# Patient Record
Sex: FEMALE | Race: WHITE | NOT HISPANIC OR LATINO | URBAN - METROPOLITAN AREA
[De-identification: names, ages, dates, MRNs, and addresses within clinical notes are randomized per-mention and may not be internally consistent; named-entity substitution may affect disease eponyms.]

---

## 2020-06-10 ENCOUNTER — OFFICE (OUTPATIENT)
Dept: URBAN - METROPOLITAN AREA CLINIC 10 | Facility: CLINIC | Age: 35
End: 2020-06-10

## 2020-06-10 VITALS
SYSTOLIC BLOOD PRESSURE: 134 MMHG | DIASTOLIC BLOOD PRESSURE: 95 MMHG | HEART RATE: 106 BPM | WEIGHT: 187 LBS | HEIGHT: 68 IN

## 2020-06-10 DIAGNOSIS — K30 FUNCTIONAL DYSPEPSIA: ICD-10-CM

## 2020-06-10 DIAGNOSIS — R10.32 LEFT LOWER QUADRANT PAIN: ICD-10-CM

## 2020-06-10 DIAGNOSIS — Z86.010 PERSONAL HISTORY OF COLONIC POLYPS: ICD-10-CM

## 2020-06-10 DIAGNOSIS — R10.31 RIGHT LOWER QUADRANT PAIN: ICD-10-CM

## 2020-06-10 PROCEDURE — 99204 OFFICE O/P NEW MOD 45 MIN: CPT | Performed by: INTERNAL MEDICINE

## 2020-06-10 RX ORDER — HYOSCYAMINE SULFATE 0.12 MG/1
TABLET ORAL
Qty: 60 | Refills: 3 | Status: COMPLETED
Start: 2020-06-10 | End: 2020-11-16

## 2020-06-10 RX ORDER — PANTOPRAZOLE SODIUM 40 MG/1
TABLET, DELAYED RELEASE ORAL
Qty: 30 | Refills: 11 | Status: COMPLETED
Start: 2020-06-10 | End: 2020-11-16

## 2020-06-10 RX ORDER — SODIUM PICOSULFATE, MAGNESIUM OXIDE, AND ANHYDROUS CITRIC ACID 10; 3.5; 12 MG/160ML; G/160ML; G/160ML
LIQUID ORAL
Qty: 1 | Refills: 0 | Status: COMPLETED
Start: 2020-06-10 | End: 2020-07-16

## 2020-07-16 ENCOUNTER — AMBULATORY SURGICAL CENTER (OUTPATIENT)
Dept: URBAN - METROPOLITAN AREA SURGERY 1 | Facility: SURGERY | Age: 35
End: 2020-07-16
Payer: COMMERCIAL

## 2020-07-16 ENCOUNTER — OFFICE (OUTPATIENT)
Dept: URBAN - METROPOLITAN AREA PATHOLOGY 22 | Facility: PATHOLOGY | Age: 35
End: 2020-07-16

## 2020-07-16 ENCOUNTER — AMBULATORY SURGICAL CENTER (OUTPATIENT)
Dept: URBAN - METROPOLITAN AREA SURGERY 1 | Facility: SURGERY | Age: 35
End: 2020-07-16

## 2020-07-16 VITALS
SYSTOLIC BLOOD PRESSURE: 142 MMHG | WEIGHT: 182 LBS | HEART RATE: 89 BPM | SYSTOLIC BLOOD PRESSURE: 124 MMHG | SYSTOLIC BLOOD PRESSURE: 119 MMHG | HEART RATE: 89 BPM | SYSTOLIC BLOOD PRESSURE: 130 MMHG | SYSTOLIC BLOOD PRESSURE: 130 MMHG | TEMPERATURE: 98.7 F | TEMPERATURE: 98.7 F | HEART RATE: 98 BPM | TEMPERATURE: 97.9 F | SYSTOLIC BLOOD PRESSURE: 142 MMHG | HEART RATE: 90 BPM | DIASTOLIC BLOOD PRESSURE: 62 MMHG | DIASTOLIC BLOOD PRESSURE: 45 MMHG | WEIGHT: 182 LBS | DIASTOLIC BLOOD PRESSURE: 62 MMHG | DIASTOLIC BLOOD PRESSURE: 75 MMHG | SYSTOLIC BLOOD PRESSURE: 115 MMHG | DIASTOLIC BLOOD PRESSURE: 45 MMHG | RESPIRATION RATE: 17 BRPM | RESPIRATION RATE: 17 BRPM | OXYGEN SATURATION: 100 % | SYSTOLIC BLOOD PRESSURE: 142 MMHG | RESPIRATION RATE: 16 BRPM | HEART RATE: 89 BPM | TEMPERATURE: 97.9 F | SYSTOLIC BLOOD PRESSURE: 119 MMHG | DIASTOLIC BLOOD PRESSURE: 74 MMHG | HEART RATE: 98 BPM | HEART RATE: 98 BPM | HEIGHT: 68 IN | SYSTOLIC BLOOD PRESSURE: 130 MMHG | SYSTOLIC BLOOD PRESSURE: 115 MMHG | OXYGEN SATURATION: 100 % | SYSTOLIC BLOOD PRESSURE: 124 MMHG | HEART RATE: 90 BPM | RESPIRATION RATE: 18 BRPM | HEART RATE: 95 BPM | DIASTOLIC BLOOD PRESSURE: 75 MMHG | SYSTOLIC BLOOD PRESSURE: 124 MMHG | SYSTOLIC BLOOD PRESSURE: 119 MMHG | DIASTOLIC BLOOD PRESSURE: 60 MMHG | RESPIRATION RATE: 18 BRPM | SYSTOLIC BLOOD PRESSURE: 115 MMHG | DIASTOLIC BLOOD PRESSURE: 45 MMHG | RESPIRATION RATE: 16 BRPM | DIASTOLIC BLOOD PRESSURE: 60 MMHG | TEMPERATURE: 98.7 F | HEART RATE: 88 BPM | DIASTOLIC BLOOD PRESSURE: 60 MMHG | HEART RATE: 88 BPM | HEIGHT: 68 IN | RESPIRATION RATE: 16 BRPM | HEART RATE: 95 BPM | RESPIRATION RATE: 17 BRPM | DIASTOLIC BLOOD PRESSURE: 75 MMHG | DIASTOLIC BLOOD PRESSURE: 74 MMHG | DIASTOLIC BLOOD PRESSURE: 62 MMHG | WEIGHT: 182 LBS | DIASTOLIC BLOOD PRESSURE: 74 MMHG | HEART RATE: 88 BPM | TEMPERATURE: 97.9 F | HEIGHT: 68 IN | OXYGEN SATURATION: 100 % | RESPIRATION RATE: 18 BRPM | HEART RATE: 90 BPM | HEART RATE: 95 BPM

## 2020-07-16 DIAGNOSIS — D12.3 BENIGN NEOPLASM OF TRANSVERSE COLON: ICD-10-CM

## 2020-07-16 DIAGNOSIS — Z86.010 PERSONAL HISTORY OF COLONIC POLYPS: ICD-10-CM

## 2020-07-16 PROBLEM — K63.5 POLYP OF COLON: Status: ACTIVE | Noted: 2020-07-16

## 2020-07-16 PROCEDURE — 45385 COLONOSCOPY W/LESION REMOVAL: CPT | Mod: 33 | Performed by: INTERNAL MEDICINE

## 2020-07-16 PROCEDURE — 88305 TISSUE EXAM BY PATHOLOGIST: CPT | Performed by: INTERNAL MEDICINE

## 2020-07-16 NOTE — SERVICENOTES
Start time: 1416
Cecum: 1428
TI intubation: no 
End time:1445

[place] Bowel Prep Score:     5
-right colon:                        2
-transverse:                        2
-left colon:                          1

## 2020-07-16 NOTE — SERVICENOTES
Start time: 1416
Cecum: 1428
TI intubation: no 
End time:1445

[place] Bowel Prep Score:     5
-right colon:                        2
-transverse:                        2
-left colon:                          1

## 2020-07-16 NOTE — SERVICENOTES
Start time: 1416
Cecum: 1428
TI intubation: no 
End time:1445

[place] Bowel Prep Score:     5
-right colon:                        2
-transverse:                        2
-left colon:                          1

## 2020-11-16 ENCOUNTER — OFFICE (OUTPATIENT)
Dept: URBAN - METROPOLITAN AREA CLINIC 10 | Facility: CLINIC | Age: 35
End: 2020-11-16
Payer: COMMERCIAL

## 2020-11-16 VITALS
SYSTOLIC BLOOD PRESSURE: 138 MMHG | HEIGHT: 69 IN | HEART RATE: 110 BPM | WEIGHT: 181 LBS | DIASTOLIC BLOOD PRESSURE: 92 MMHG

## 2020-11-16 DIAGNOSIS — Z86.010 PERSONAL HISTORY OF COLONIC POLYPS: ICD-10-CM

## 2020-11-16 DIAGNOSIS — R10.31 RIGHT LOWER QUADRANT PAIN: ICD-10-CM

## 2020-11-16 DIAGNOSIS — R10.33 PERIUMBILICAL PAIN: ICD-10-CM

## 2020-11-16 PROCEDURE — 99214 OFFICE O/P EST MOD 30 MIN: CPT | Performed by: PHYSICIAN ASSISTANT

## 2020-11-16 RX ORDER — HYOSCYAMINE SULFATE 0.12 MG/1
TABLET ORAL; SUBLINGUAL
Qty: 120 | Refills: 0 | Status: COMPLETED
Start: 2020-11-16 | End: 2023-05-01

## 2020-11-16 RX ORDER — POLYETHYLENE GLYCOL 3350, SODIUM SULFATE, SODIUM CHLORIDE, POTASSIUM CHLORIDE, ASCORBIC ACID, SODIUM ASCORBATE 140-9-5.2G
KIT ORAL
Qty: 1 | Refills: 0 | Status: COMPLETED
Start: 2020-11-16 | End: 2020-12-14

## 2020-12-14 ENCOUNTER — AMBULATORY SURGICAL CENTER (OUTPATIENT)
Dept: URBAN - METROPOLITAN AREA SURGERY 1 | Facility: SURGERY | Age: 35
End: 2020-12-14
Payer: COMMERCIAL

## 2020-12-14 VITALS
SYSTOLIC BLOOD PRESSURE: 99 MMHG | OXYGEN SATURATION: 99 % | TEMPERATURE: 97 F | DIASTOLIC BLOOD PRESSURE: 83 MMHG | OXYGEN SATURATION: 100 % | HEART RATE: 102 BPM | TEMPERATURE: 97 F | SYSTOLIC BLOOD PRESSURE: 134 MMHG | OXYGEN SATURATION: 99 % | SYSTOLIC BLOOD PRESSURE: 134 MMHG | HEART RATE: 100 BPM | DIASTOLIC BLOOD PRESSURE: 53 MMHG | DIASTOLIC BLOOD PRESSURE: 42 MMHG | HEART RATE: 102 BPM | DIASTOLIC BLOOD PRESSURE: 83 MMHG | HEART RATE: 92 BPM | RESPIRATION RATE: 20 BRPM | SYSTOLIC BLOOD PRESSURE: 100 MMHG | HEART RATE: 94 BPM | HEART RATE: 94 BPM | SYSTOLIC BLOOD PRESSURE: 112 MMHG | SYSTOLIC BLOOD PRESSURE: 99 MMHG | DIASTOLIC BLOOD PRESSURE: 74 MMHG | RESPIRATION RATE: 16 BRPM | SYSTOLIC BLOOD PRESSURE: 107 MMHG | SYSTOLIC BLOOD PRESSURE: 124 MMHG | SYSTOLIC BLOOD PRESSURE: 100 MMHG | TEMPERATURE: 98.4 F | HEART RATE: 108 BPM | OXYGEN SATURATION: 97 % | DIASTOLIC BLOOD PRESSURE: 42 MMHG | OXYGEN SATURATION: 97 % | RESPIRATION RATE: 17 BRPM | DIASTOLIC BLOOD PRESSURE: 69 MMHG | WEIGHT: 173 LBS | SYSTOLIC BLOOD PRESSURE: 124 MMHG | OXYGEN SATURATION: 98 % | HEART RATE: 97 BPM | OXYGEN SATURATION: 100 % | DIASTOLIC BLOOD PRESSURE: 53 MMHG | SYSTOLIC BLOOD PRESSURE: 112 MMHG | HEART RATE: 92 BPM | TEMPERATURE: 98.4 F | HEART RATE: 97 BPM | WEIGHT: 173 LBS | DIASTOLIC BLOOD PRESSURE: 69 MMHG | DIASTOLIC BLOOD PRESSURE: 65 MMHG | RESPIRATION RATE: 16 BRPM | DIASTOLIC BLOOD PRESSURE: 74 MMHG | HEART RATE: 108 BPM | RESPIRATION RATE: 20 BRPM | DIASTOLIC BLOOD PRESSURE: 65 MMHG | HEIGHT: 69 IN | SYSTOLIC BLOOD PRESSURE: 107 MMHG | RESPIRATION RATE: 17 BRPM | HEART RATE: 100 BPM | OXYGEN SATURATION: 98 % | HEIGHT: 69 IN

## 2020-12-14 DIAGNOSIS — Z86.010 PERSONAL HISTORY OF COLONIC POLYPS: ICD-10-CM

## 2020-12-14 DIAGNOSIS — K64.0 FIRST DEGREE HEMORRHOIDS: ICD-10-CM

## 2020-12-14 PROCEDURE — 45378 DIAGNOSTIC COLONOSCOPY: CPT | Performed by: INTERNAL MEDICINE

## 2023-05-01 ENCOUNTER — OFFICE (OUTPATIENT)
Dept: URBAN - METROPOLITAN AREA CLINIC 10 | Facility: CLINIC | Age: 38
End: 2023-05-01

## 2023-05-01 VITALS
OXYGEN SATURATION: 99 % | HEART RATE: 102 BPM | SYSTOLIC BLOOD PRESSURE: 148 MMHG | HEIGHT: 69 IN | DIASTOLIC BLOOD PRESSURE: 90 MMHG | WEIGHT: 161 LBS

## 2023-05-01 DIAGNOSIS — Z86.010 PERSONAL HISTORY OF COLONIC POLYPS: ICD-10-CM

## 2023-05-01 DIAGNOSIS — A04.72 ENTEROCOLITIS DUE TO CLOSTRIDIUM DIFFICILE, NOT SPECIFIED AS: ICD-10-CM

## 2023-05-01 PROCEDURE — 99214 OFFICE O/P EST MOD 30 MIN: CPT | Performed by: PHYSICIAN ASSISTANT

## 2023-05-02 LAB
CBC WITH DIFFERENTIAL/PLATELET: BASO (ABSOLUTE): 0 X10E3/UL (ref 0–0.2)
CBC WITH DIFFERENTIAL/PLATELET: BASOS: 0 %
CBC WITH DIFFERENTIAL/PLATELET: EOS (ABSOLUTE): 0.1 X10E3/UL (ref 0–0.4)
CBC WITH DIFFERENTIAL/PLATELET: EOS: 1 %
CBC WITH DIFFERENTIAL/PLATELET: HEMATOCRIT: 41.5 % (ref 34–46.6)
CBC WITH DIFFERENTIAL/PLATELET: HEMOGLOBIN: 13.8 G/DL (ref 11.1–15.9)
CBC WITH DIFFERENTIAL/PLATELET: IMMATURE GRANS (ABS): 0.1 X10E3/UL (ref 0–0.1)
CBC WITH DIFFERENTIAL/PLATELET: IMMATURE GRANULOCYTES: 1 %
CBC WITH DIFFERENTIAL/PLATELET: LYMPHS (ABSOLUTE): 1.3 X10E3/UL (ref 0.7–3.1)
CBC WITH DIFFERENTIAL/PLATELET: LYMPHS: 14 %
CBC WITH DIFFERENTIAL/PLATELET: MCH: 31.5 PG (ref 26.6–33)
CBC WITH DIFFERENTIAL/PLATELET: MCHC: 33.3 G/DL (ref 31.5–35.7)
CBC WITH DIFFERENTIAL/PLATELET: MCV: 95 FL (ref 79–97)
CBC WITH DIFFERENTIAL/PLATELET: MONOCYTES(ABSOLUTE): 0.8 X10E3/UL (ref 0.1–0.9)
CBC WITH DIFFERENTIAL/PLATELET: MONOCYTES: 9 %
CBC WITH DIFFERENTIAL/PLATELET: NEUTROPHILS (ABSOLUTE): 6.6 X10E3/UL (ref 1.4–7)
CBC WITH DIFFERENTIAL/PLATELET: NEUTROPHILS: 75 %
CBC WITH DIFFERENTIAL/PLATELET: PLATELETS: 221 X10E3/UL (ref 150–450)
CBC WITH DIFFERENTIAL/PLATELET: RBC: 4.38 X10E6/UL (ref 3.77–5.28)
CBC WITH DIFFERENTIAL/PLATELET: RDW: 12.5 % (ref 11.7–15.4)
CBC WITH DIFFERENTIAL/PLATELET: WBC: 8.8 X10E3/UL (ref 3.4–10.8)
COMP. METABOLIC PANEL (14): A/G RATIO: 2 (ref 1.2–2.2)
COMP. METABOLIC PANEL (14): ALBUMIN: 4.7 G/DL (ref 3.8–4.8)
COMP. METABOLIC PANEL (14): ALKALINE PHOSPHATASE: 72 IU/L (ref 44–121)
COMP. METABOLIC PANEL (14): ALT (SGPT): 15 IU/L (ref 0–32)
COMP. METABOLIC PANEL (14): AST (SGOT): 18 IU/L (ref 0–40)
COMP. METABOLIC PANEL (14): BILIRUBIN, TOTAL: 0.3 MG/DL (ref 0–1.2)
COMP. METABOLIC PANEL (14): BUN/CREATININE RATIO: 11 (ref 9–23)
COMP. METABOLIC PANEL (14): BUN: 9 MG/DL (ref 6–20)
COMP. METABOLIC PANEL (14): CALCIUM: 9.1 MG/DL (ref 8.7–10.2)
COMP. METABOLIC PANEL (14): CARBON DIOXIDE, TOTAL: 22 MMOL/L (ref 20–29)
COMP. METABOLIC PANEL (14): CHLORIDE: 103 MMOL/L (ref 96–106)
COMP. METABOLIC PANEL (14): CREATININE: 0.8 MG/DL (ref 0.57–1)
COMP. METABOLIC PANEL (14): EGFR: 96 ML/MIN/1.73 (ref 59–?)
COMP. METABOLIC PANEL (14): GLOBULIN, TOTAL: 2.3 G/DL (ref 1.5–4.5)
COMP. METABOLIC PANEL (14): GLUCOSE: 91 MG/DL (ref 70–99)
COMP. METABOLIC PANEL (14): POTASSIUM: 4.8 MMOL/L (ref 3.5–5.2)
COMP. METABOLIC PANEL (14): PROTEIN, TOTAL: 7 G/DL (ref 6–8.5)
COMP. METABOLIC PANEL (14): SODIUM: 139 MMOL/L (ref 134–144)

## 2023-05-09 LAB
CALPROTECTIN, FECAL: 387 UG/G — HIGH (ref 0–120)
CBC WITH DIFFERENTIAL/PLATELET: BASO (ABSOLUTE): (no result)
CBC WITH DIFFERENTIAL/PLATELET: BASOS: (no result)
CBC WITH DIFFERENTIAL/PLATELET: EOS (ABSOLUTE): (no result)
CBC WITH DIFFERENTIAL/PLATELET: EOS: (no result)
CBC WITH DIFFERENTIAL/PLATELET: HEMATOCRIT: (no result)
CBC WITH DIFFERENTIAL/PLATELET: HEMATOLOGY COMMENTS: (no result)
CBC WITH DIFFERENTIAL/PLATELET: HEMOGLOBIN: (no result)
CBC WITH DIFFERENTIAL/PLATELET: IMMATURE CELLS: (no result)
CBC WITH DIFFERENTIAL/PLATELET: IMMATURE GRANS (ABS): (no result)
CBC WITH DIFFERENTIAL/PLATELET: IMMATURE GRANULOCYTES: (no result)
CBC WITH DIFFERENTIAL/PLATELET: LYMPHS (ABSOLUTE): (no result)
CBC WITH DIFFERENTIAL/PLATELET: LYMPHS: (no result)
CBC WITH DIFFERENTIAL/PLATELET: MCH: (no result)
CBC WITH DIFFERENTIAL/PLATELET: MCHC: (no result)
CBC WITH DIFFERENTIAL/PLATELET: MCV: (no result)
CBC WITH DIFFERENTIAL/PLATELET: MONOCYTES(ABSOLUTE): (no result)
CBC WITH DIFFERENTIAL/PLATELET: MONOCYTES: (no result)
CBC WITH DIFFERENTIAL/PLATELET: NEUTROPHILS (ABSOLUTE): (no result)
CBC WITH DIFFERENTIAL/PLATELET: NEUTROPHILS: (no result)
CBC WITH DIFFERENTIAL/PLATELET: NRBC: (no result)
CBC WITH DIFFERENTIAL/PLATELET: PLATELETS: (no result)
CBC WITH DIFFERENTIAL/PLATELET: RBC: (no result)
CBC WITH DIFFERENTIAL/PLATELET: RDW: (no result)
CBC WITH DIFFERENTIAL/PLATELET: WBC: (no result) X10E3/UL
COMP. METABOLIC PANEL (14): A/G RATIO: (no result)
COMP. METABOLIC PANEL (14): ALBUMIN: (no result)
COMP. METABOLIC PANEL (14): ALKALINE PHOSPHATASE: (no result)
COMP. METABOLIC PANEL (14): ALT (SGPT): (no result)
COMP. METABOLIC PANEL (14): AST (SGOT): (no result)
COMP. METABOLIC PANEL (14): BILIRUBIN, TOTAL: (no result)
COMP. METABOLIC PANEL (14): BUN/CREATININE RATIO: (no result)
COMP. METABOLIC PANEL (14): BUN: (no result)
COMP. METABOLIC PANEL (14): CALCIUM: (no result)
COMP. METABOLIC PANEL (14): CARBON DIOXIDE, TOTAL: (no result)
COMP. METABOLIC PANEL (14): CHLORIDE: (no result)
COMP. METABOLIC PANEL (14): CREATININE: (no result)
COMP. METABOLIC PANEL (14): EGFR: (no result)
COMP. METABOLIC PANEL (14): GLOBULIN, TOTAL: (no result)
COMP. METABOLIC PANEL (14): GLUCOSE: (no result) MG/DL
COMP. METABOLIC PANEL (14): POTASSIUM: (no result)
COMP. METABOLIC PANEL (14): PROTEIN, TOTAL: (no result)
COMP. METABOLIC PANEL (14): SODIUM: (no result)
GI PROFILE, STOOL, PCR: ADENOVIRUS F 40/41: NOT DETECTED
GI PROFILE, STOOL, PCR: ASTROVIRUS: NOT DETECTED
GI PROFILE, STOOL, PCR: C DIFFICILE TOXIN A/B: DETECTED
GI PROFILE, STOOL, PCR: CAMPYLOBACTER: NOT DETECTED
GI PROFILE, STOOL, PCR: CRYPTOSPORIDIUM: NOT DETECTED
GI PROFILE, STOOL, PCR: CYCLOSPORA CAYETANENSIS: NOT DETECTED
GI PROFILE, STOOL, PCR: E COLI O157: (no result)
GI PROFILE, STOOL, PCR: ENTAMOEBA HISTOLYTICA: NOT DETECTED
GI PROFILE, STOOL, PCR: ENTEROAGGREGATIVE E COLI: NOT DETECTED
GI PROFILE, STOOL, PCR: ENTEROPATHOGENIC E COLI: NOT DETECTED
GI PROFILE, STOOL, PCR: ENTEROTOXIGENIC E COLI: NOT DETECTED
GI PROFILE, STOOL, PCR: GIARDIA LAMBLIA: NOT DETECTED
GI PROFILE, STOOL, PCR: NOROVIRUS GI/GII: NOT DETECTED
GI PROFILE, STOOL, PCR: PLESIOMONAS SHIGELLOIDES: NOT DETECTED
GI PROFILE, STOOL, PCR: ROTAVIRUS A: NOT DETECTED
GI PROFILE, STOOL, PCR: SALMONELLA: NOT DETECTED
GI PROFILE, STOOL, PCR: SAPOVIRUS: NOT DETECTED
GI PROFILE, STOOL, PCR: SHIGA-TOXIN-PRODUCING E COLI: NOT DETECTED
GI PROFILE, STOOL, PCR: SHIGELLA/ENTEROINVASIVE E COLI: NOT DETECTED
GI PROFILE, STOOL, PCR: VIBRIO CHOLERAE: NOT DETECTED
GI PROFILE, STOOL, PCR: VIBRIO: NOT DETECTED
GI PROFILE, STOOL, PCR: YERSINIA ENTEROCOLITICA: NOT DETECTED
REQUEST PROBLEM: (no result)
REQUEST PROBLEM: (no result)
SPECIMEN STATUS REPORT: (no result)

## 2023-05-16 ENCOUNTER — OFFICE (OUTPATIENT)
Dept: URBAN - METROPOLITAN AREA CLINIC 11 | Facility: CLINIC | Age: 38
End: 2023-05-16
Payer: COMMERCIAL

## 2023-05-16 VITALS
HEART RATE: 85 BPM | SYSTOLIC BLOOD PRESSURE: 136 MMHG | HEART RATE: 90 BPM | DIASTOLIC BLOOD PRESSURE: 84 MMHG | DIASTOLIC BLOOD PRESSURE: 77 MMHG | TEMPERATURE: 97.8 F | HEART RATE: 91 BPM | HEART RATE: 93 BPM | SYSTOLIC BLOOD PRESSURE: 138 MMHG | TEMPERATURE: 98.2 F | HEIGHT: 69 IN | DIASTOLIC BLOOD PRESSURE: 85 MMHG | SYSTOLIC BLOOD PRESSURE: 144 MMHG | SYSTOLIC BLOOD PRESSURE: 127 MMHG

## 2023-05-16 DIAGNOSIS — A04.71 ENTEROCOLITIS DUE TO CLOSTRIDIUM DIFFICILE, RECURRENT: ICD-10-CM

## 2023-05-16 PROCEDURE — 96413 CHEMO IV INFUSION 1 HR: CPT | Performed by: INTERNAL MEDICINE

## 2023-05-16 NOTE — SERVICEHPINOTES
See follow up visit from  5/1/2023   .  brDate / Results of last TB test   -   brLabs and/or Additional orders:  N/AbrInsurance authorization: [facility]brPh[facility]:  [facility] Bill  brRate of Infusion:  1 hour   
br   Infusion order placed on:  5/9/2023   
br

## 2023-06-19 ENCOUNTER — OFFICE (OUTPATIENT)
Dept: URBAN - METROPOLITAN AREA CLINIC 10 | Facility: CLINIC | Age: 38
End: 2023-06-19

## 2023-06-19 VITALS
WEIGHT: 159 LBS | OXYGEN SATURATION: 98 % | DIASTOLIC BLOOD PRESSURE: 90 MMHG | HEART RATE: 92 BPM | SYSTOLIC BLOOD PRESSURE: 139 MMHG | HEIGHT: 69 IN

## 2023-06-19 DIAGNOSIS — A04.71 ENTEROCOLITIS DUE TO CLOSTRIDIUM DIFFICILE, RECURRENT: ICD-10-CM

## 2023-06-19 DIAGNOSIS — K58.9 IRRITABLE BOWEL SYNDROME WITHOUT DIARRHEA: ICD-10-CM

## 2023-06-19 PROCEDURE — 99214 OFFICE O/P EST MOD 30 MIN: CPT | Performed by: INTERNAL MEDICINE

## 2023-06-19 RX ORDER — RIFAXIMIN 550 MG/1
TABLET ORAL
Qty: 42 | Refills: 0 | Status: ACTIVE
Start: 2023-06-19

## 2023-06-19 RX ORDER — SODIUM PICOSULFATE, MAGNESIUM OXIDE, AND ANHYDROUS CITRIC ACID 10; 3.5; 12 MG/160ML; G/160ML; G/160ML
LIQUID ORAL
Qty: 350 | Refills: 0 | Status: COMPLETED
Start: 2023-06-19 | End: 2023-06-22

## 2023-06-22 ENCOUNTER — AMBULATORY SURGICAL CENTER (OUTPATIENT)
Dept: URBAN - METROPOLITAN AREA SURGERY 1 | Facility: SURGERY | Age: 38
End: 2023-06-22
Payer: COMMERCIAL

## 2023-06-22 ENCOUNTER — OFFICE (OUTPATIENT)
Dept: URBAN - METROPOLITAN AREA PATHOLOGY 12 | Facility: PATHOLOGY | Age: 38
End: 2023-06-22
Payer: COMMERCIAL

## 2023-06-22 VITALS
DIASTOLIC BLOOD PRESSURE: 91 MMHG | HEART RATE: 72 BPM | HEART RATE: 78 BPM | SYSTOLIC BLOOD PRESSURE: 146 MMHG | HEIGHT: 69 IN | SYSTOLIC BLOOD PRESSURE: 145 MMHG | DIASTOLIC BLOOD PRESSURE: 91 MMHG | TEMPERATURE: 98.6 F | TEMPERATURE: 98.6 F | DIASTOLIC BLOOD PRESSURE: 90 MMHG | SYSTOLIC BLOOD PRESSURE: 146 MMHG | WEIGHT: 157.2 LBS | RESPIRATION RATE: 18 BRPM | SYSTOLIC BLOOD PRESSURE: 134 MMHG | DIASTOLIC BLOOD PRESSURE: 83 MMHG | RESPIRATION RATE: 20 BRPM | RESPIRATION RATE: 18 BRPM | RESPIRATION RATE: 20 BRPM | RESPIRATION RATE: 20 BRPM | DIASTOLIC BLOOD PRESSURE: 93 MMHG | HEART RATE: 75 BPM | HEIGHT: 69 IN | TEMPERATURE: 98.2 F | OXYGEN SATURATION: 100 % | DIASTOLIC BLOOD PRESSURE: 90 MMHG | SYSTOLIC BLOOD PRESSURE: 146 MMHG | SYSTOLIC BLOOD PRESSURE: 134 MMHG | TEMPERATURE: 98.6 F | HEART RATE: 72 BPM | TEMPERATURE: 98.2 F | SYSTOLIC BLOOD PRESSURE: 134 MMHG | DIASTOLIC BLOOD PRESSURE: 77 MMHG | DIASTOLIC BLOOD PRESSURE: 77 MMHG | SYSTOLIC BLOOD PRESSURE: 145 MMHG | WEIGHT: 157.2 LBS | HEART RATE: 72 BPM | RESPIRATION RATE: 16 BRPM | HEIGHT: 69 IN | OXYGEN SATURATION: 100 % | DIASTOLIC BLOOD PRESSURE: 77 MMHG | HEART RATE: 89 BPM | HEART RATE: 75 BPM | RESPIRATION RATE: 16 BRPM | HEART RATE: 78 BPM | OXYGEN SATURATION: 100 % | HEART RATE: 89 BPM | WEIGHT: 157.2 LBS | DIASTOLIC BLOOD PRESSURE: 93 MMHG | HEART RATE: 75 BPM | OXYGEN SATURATION: 96 % | DIASTOLIC BLOOD PRESSURE: 91 MMHG | RESPIRATION RATE: 16 BRPM | HEART RATE: 89 BPM | OXYGEN SATURATION: 96 % | DIASTOLIC BLOOD PRESSURE: 83 MMHG | DIASTOLIC BLOOD PRESSURE: 90 MMHG | SYSTOLIC BLOOD PRESSURE: 145 MMHG | TEMPERATURE: 98.2 F | SYSTOLIC BLOOD PRESSURE: 142 MMHG | DIASTOLIC BLOOD PRESSURE: 93 MMHG | SYSTOLIC BLOOD PRESSURE: 142 MMHG | OXYGEN SATURATION: 96 % | HEART RATE: 78 BPM | SYSTOLIC BLOOD PRESSURE: 142 MMHG | RESPIRATION RATE: 18 BRPM | DIASTOLIC BLOOD PRESSURE: 83 MMHG

## 2023-06-22 DIAGNOSIS — R19.7 DIARRHEA, UNSPECIFIED: ICD-10-CM

## 2023-06-22 PROCEDURE — 88342 IMHCHEM/IMCYTCHM 1ST ANTB: CPT | Performed by: PATHOLOGY

## 2023-06-22 PROCEDURE — 88305 TISSUE EXAM BY PATHOLOGIST: CPT | Performed by: PATHOLOGY

## 2023-06-22 PROCEDURE — 45380 COLONOSCOPY AND BIOPSY: CPT | Performed by: INTERNAL MEDICINE

## 2025-06-16 ENCOUNTER — OFFICE (OUTPATIENT)
Dept: URBAN - METROPOLITAN AREA CLINIC 10 | Facility: CLINIC | Age: 40
End: 2025-06-16
Payer: COMMERCIAL

## 2025-06-16 VITALS
DIASTOLIC BLOOD PRESSURE: 86 MMHG | HEIGHT: 69 IN | OXYGEN SATURATION: 99 % | HEART RATE: 119 BPM | WEIGHT: 164 LBS | SYSTOLIC BLOOD PRESSURE: 134 MMHG

## 2025-06-16 DIAGNOSIS — R10.84 GENERALIZED ABDOMINAL PAIN: ICD-10-CM

## 2025-06-16 DIAGNOSIS — R11.0 NAUSEA: ICD-10-CM

## 2025-06-16 DIAGNOSIS — R19.7 DIARRHEA, UNSPECIFIED: ICD-10-CM

## 2025-06-16 PROCEDURE — 99214 OFFICE O/P EST MOD 30 MIN: CPT | Performed by: INTERNAL MEDICINE
